# Patient Record
Sex: MALE | Race: WHITE | NOT HISPANIC OR LATINO | Employment: OTHER | URBAN - METROPOLITAN AREA
[De-identification: names, ages, dates, MRNs, and addresses within clinical notes are randomized per-mention and may not be internally consistent; named-entity substitution may affect disease eponyms.]

---

## 2017-03-22 ENCOUNTER — DEVICE CHECK (OUTPATIENT)
Dept: CARDIOLOGY | Facility: MEDICAL CENTER | Age: 67
End: 2017-03-22

## 2017-03-22 NOTE — PROGRESS NOTES
Type of monitoring: Remote    : SourceTour    Date of Transmission: 3-    Type of device: CRT-D    Mode: VVIR-persistent AF/    Rate: 60 ppm    Right Ventricular: 460 ohms  Left Ventricular: 658 ohms  High Voltage: 67 ohms      Battery status: 7 yrs  Charge time: 9.2 seconds    Episodes: since :1-VT-1; 392-NSVT/NST    Keep appt in May with RS/pmc

## 2017-05-16 DIAGNOSIS — I10 ESSENTIAL HYPERTENSION: ICD-10-CM

## 2017-05-16 DIAGNOSIS — I50.22 CHRONIC SYSTOLIC CONGESTIVE HEART FAILURE (HCC): ICD-10-CM

## 2017-05-16 DIAGNOSIS — I42.0 DILATED CARDIOMYOPATHY (HCC): ICD-10-CM

## 2017-05-16 RX ORDER — LOSARTAN POTASSIUM 50 MG/1
50 TABLET ORAL DAILY
Qty: 90 TAB | Refills: 3 | OUTPATIENT
Start: 2017-05-16

## 2017-05-17 ENCOUNTER — TELEPHONE (OUTPATIENT)
Dept: CARDIOLOGY | Facility: MEDICAL CENTER | Age: 67
End: 2017-05-17

## 2017-05-17 DIAGNOSIS — I42.0 DILATED CARDIOMYOPATHY (HCC): ICD-10-CM

## 2017-05-17 RX ORDER — CARVEDILOL 25 MG/1
25 TABLET ORAL 2 TIMES DAILY WITH MEALS
Qty: 180 TAB | Refills: 3 | Status: SHIPPED | OUTPATIENT
Start: 2017-05-17 | End: 2017-06-19 | Stop reason: SDUPTHER

## 2017-05-17 NOTE — TELEPHONE ENCOUNTER
Jasmin Garcia R.N.                   EDWARD/Sriram Mar is calling back with update. States he's doing well and pulse is at 79. He can be reached at [phone] for call back.

## 2017-05-17 NOTE — TELEPHONE ENCOUNTER
----- Message from Samreen Peñaloza, Med Ass't sent at 5/17/2017  9:05 AM PDT -----  Patient's device transmitted via home monitor-- patient received shock appears to be AF with RVR-- current EGM does show a HR of 200 bpm.  He states that he has been out of his Carvedilol since the weekend-- awaiting refill.  BIV device/no RA lead. Detection is at 195.    I advised he may get another shock due to HR.    Thanks  Samreen

## 2017-05-17 NOTE — TELEPHONE ENCOUNTER
Notified AUDREY, spoke with pt. Great news!! He will keep appt with RS next week, but cant make FU DS bc his house sold 2 days on the market and he will move to [facility] next month, he has cardiologist up there, advised to call now for appt when he gets there.

## 2017-05-17 NOTE — TELEPHONE ENCOUNTER
Carvedilol called in. Advised pt get dose and take it asap. Come in this am to see JE as hasnt seen EP in a few years. Pt will attempt around 11, if cant make it will come in at 1.

## 2017-05-17 NOTE — TELEPHONE ENCOUNTER
Per JE since last dose carvedilol was Sunday as he ran out, pt to get dose in him asap, det rate is 195, if rate comes down pt does not need to come in and will schedule FU with DS, pt will call this afternoon around 1 to check in.

## 2017-05-24 ENCOUNTER — NON-PROVIDER VISIT (OUTPATIENT)
Dept: CARDIOLOGY | Facility: MEDICAL CENTER | Age: 67
End: 2017-05-24
Payer: MEDICARE

## 2017-05-24 ENCOUNTER — OFFICE VISIT (OUTPATIENT)
Dept: CARDIOLOGY | Facility: MEDICAL CENTER | Age: 67
End: 2017-05-24
Payer: MEDICARE

## 2017-05-24 VITALS
WEIGHT: 196 LBS | SYSTOLIC BLOOD PRESSURE: 110 MMHG | BODY MASS INDEX: 26.55 KG/M2 | DIASTOLIC BLOOD PRESSURE: 60 MMHG | HEIGHT: 72 IN | OXYGEN SATURATION: 96 % | HEART RATE: 64 BPM

## 2017-05-24 DIAGNOSIS — Z79.01 ANTICOAGULATED ON WARFARIN: ICD-10-CM

## 2017-05-24 DIAGNOSIS — I48.21 PERMANENT ATRIAL FIBRILLATION (HCC): ICD-10-CM

## 2017-05-24 DIAGNOSIS — Z95.810 BIVENTRICULAR IMPLANTABLE CARDIOVERTER-DEFIBRILLATOR IN SITU: ICD-10-CM

## 2017-05-24 DIAGNOSIS — I48.19 PERSISTENT ATRIAL FIBRILLATION (HCC): ICD-10-CM

## 2017-05-24 DIAGNOSIS — I50.22 CHRONIC SYSTOLIC CONGESTIVE HEART FAILURE (HCC): ICD-10-CM

## 2017-05-24 DIAGNOSIS — I42.0 DILATED CARDIOMYOPATHY (HCC): ICD-10-CM

## 2017-05-24 PROCEDURE — 4004F PT TOBACCO SCREEN RCVD TLK: CPT | Performed by: INTERNAL MEDICINE

## 2017-05-24 PROCEDURE — 99214 OFFICE O/P EST MOD 30 MIN: CPT | Performed by: INTERNAL MEDICINE

## 2017-05-24 PROCEDURE — G8432 DEP SCR NOT DOC, RNG: HCPCS | Performed by: INTERNAL MEDICINE

## 2017-05-24 PROCEDURE — 93283 PRGRMG EVAL IMPLANTABLE DFB: CPT | Performed by: INTERNAL MEDICINE

## 2017-05-24 PROCEDURE — 3017F COLORECTAL CA SCREEN DOC REV: CPT | Mod: 8P | Performed by: INTERNAL MEDICINE

## 2017-05-24 PROCEDURE — G8417 CALC BMI ABV UP PARAM F/U: HCPCS | Performed by: INTERNAL MEDICINE

## 2017-05-24 PROCEDURE — 1101F PT FALLS ASSESS-DOCD LE1/YR: CPT | Mod: 8P | Performed by: INTERNAL MEDICINE

## 2017-05-24 PROCEDURE — 4040F PNEUMOC VAC/ADMIN/RCVD: CPT | Performed by: INTERNAL MEDICINE

## 2017-05-24 ASSESSMENT — ENCOUNTER SYMPTOMS
PALPITATIONS: 0
NEUROLOGICAL NEGATIVE: 1
WEIGHT LOSS: 0
NAUSEA: 0
WEAKNESS: 0
BRUISES/BLEEDS EASILY: 0
PSYCHIATRIC NEGATIVE: 1
VOMITING: 0
SHORTNESS OF BREATH: 0

## 2017-05-24 NOTE — PROGRESS NOTES
"Subjective:   Jeffry Naik is a 67 y.o. male who presents today for follow-up of permanent atrial fibrillation, chronic anticoagulation, history of nonischemic cardiopathy, and biventricular AICD. The patient has had no bleeding problems on Coumadin. He ran out of his carvedilol for about 3 days and received a shock for rapid atrial fibrillation. His AICD was interrogated today and is functioning normally. He's had no more discharges since being back on his carvedilol.  The patient's had no edema or exertional dyspnea.  His wife  in April after a long solorzano with cancer, and the patient is planning to move to the [facility] to be near family.    Past Medical History   Diagnosis Date   • Atrial fibrillation (CMS-HCC)    • Congestive heart failure (CMS-[facility])    • Dilated cardiomyopathy (CMS-HCC)    • Diabetes      oral meds and insulin   • Prostate cancer (CMS-[facility])    • Biventricular implantable cardiac defibrillator in situ     • Chronic anticoagulation    • Hyperlipidemia    • Anesthesia      \"when I had my pacemaker placed,  my eyes where closed but I could feel everything\"   • Unspecified urinary incontinence    • Unspecified hemorrhagic conditions (CMS-HCC)      on coumadin   • Hypertension    • Bronchitis    • Pain 02-18-15     neck and back, 3/10   • Peripheral neuropathy (CMS-[facility])      bilat arms and hand     Past Surgical History   Procedure Laterality Date   • Aicd implant  2011     [employer] Cognis 100-D N118 implanted by Dr. Lake.  Original device implanted in .   • Cholecystectomy     • Prostatectomy, radical retro     • Recovery  2015     Performed by -Recovery Surgery at SURGERY SAME DAY [facility] ORS     Family History   Problem Relation Age of Onset   • Heart Attack Mother      History   Smoking status   • Light Tobacco Smoker -- 0.25 packs/day   • Types: Cigarettes   Smokeless tobacco   • Never Used     Comment: \"1-3 cigars per " "week\"     Allergies   Allergen Reactions   • Oxycodone-Acetaminophen Vomiting and Nausea   • Iodine Hives and Diarrhea         • Plasma Hives     Okay if given Benadryl.   • Levofloxacin [Levaquin]      Unknown rxn; \"Possibly sick to my stomach.\"   • Statins [Hmg-Coa-R Inhibitors]      Weak muscles.     Outpatient Encounter Prescriptions as of 5/24/2017   Medication Sig Dispense Refill   • carvedilol (COREG) 25 MG Tab Take 1 Tab by mouth 2 times a day, with meals. 180 Tab 3   • losartan (COZAAR) 50 MG Tab Take 1 Tab by mouth every day. 90 Tab 3   • B Complex Vitamins (VITAMIN B COMPLEX PO) Take  by mouth 2 Times a Day.     • warfarin (COUMADIN) 2 MG Tab Take 1 Tab by mouth every day. 30 Tab 0   • pramoxine (PROCTOFOAM) 1 % foam Insert  in rectum 4 times a day as needed.     • Calcium Carbonate (CALCIUM 600 PO) Take 1 Tab by mouth 2 Times a Day.     • Cholecalciferol (VITAMIN D3) 5000 UNITS CAPS Take 1 Cap by mouth every day.     • Ascorbic Acid (VITAMIN C) 1000 MG TABS Take 1,000 mg by mouth every evening. Indications: **OTC**     • Coenzyme Q10 (CO Q 10) 100 MG CAPS Take 100 mg by mouth 2 Times a Day. Indications: **OTC**     • insulin glargine (LANTUS) 100 UNIT/ML SOLN Inject 30 Units as instructed every evening. Sliding scale:  >130=35 units  >135=40 units  >140=45 units  >145=50 units     • leuprolide (LUPRON) 22.5 MG KIT 22.5 mg by Intramuscular route Every 3 Months.     • venlafaxine XR (EFFEXOR XR) 37.5 MG CP24 Take 37.5 mg by mouth every morning.     • metformin (GLUCOPHAGE) 1000 MG tablet Take 1,000 mg by mouth 2 times a day.     • bicalutamide (CASODEX) 50 MG TABS Take 50 mg by mouth every morning.     • gemfibrozil (LOPID) 600 MG TABS Take 300 mg by mouth 2 times a day.     • omeprazole (PRILOSEC) 20 MG CPDR Take 20 mg by mouth 2 Times a Day.       No facility-administered encounter medications on file as of 5/24/2017.     Review of Systems   Constitutional: Negative for weight loss and malaise/fatigue. " "  HENT: Negative for nosebleeds.    Respiratory: Negative for shortness of breath.    Cardiovascular: Negative for chest pain, palpitations and leg swelling.   Gastrointestinal: Negative for nausea and vomiting.   Neurological: Negative.  Negative for weakness.   Endo/Heme/Allergies: Does not bruise/bleed easily.   Psychiatric/Behavioral: Negative.         Objective:   /60 mmHg  Pulse 64  Ht 1.829 m (6' 0.01\")  Wt 88.905 kg (196 lb)  BMI 26.58 kg/m2  SpO2 96%    Physical Exam   Constitutional: He is oriented to person, place, and time. He appears well-developed and well-nourished. No distress.   HENT:   Head: Atraumatic.   Eyes: Conjunctivae and EOM are normal. Pupils are equal, round, and reactive to light. No scleral icterus.   Neck: Neck supple. No JVD present. No thyromegaly present.   Cardiovascular: Normal rate, regular rhythm and intact distal pulses.    No murmur heard.  Pulmonary/Chest: Effort normal and breath sounds normal. No respiratory distress. He has no wheezes. He has no rales.   AICD left upper chest.   Abdominal: Soft. Bowel sounds are normal. He exhibits no distension and no mass. There is no hepatomegaly. There is no tenderness. No hernia.   Musculoskeletal: He exhibits no edema.   Neurological: He is alert and oriented to person, place, and time.   Skin: Skin is warm and dry. He is not diaphoretic.   Psychiatric: He has a normal mood and affect.       Assessment:     1. Anticoagulated on warfarin     2. Biventricular implantable cardioverter-defibrillator in situ     3. Chronic systolic congestive heart failure (CMS-HCC)     4. Dilated cardiomyopathy (CMS-HCC)     5. Permanent atrial fibrillation (CMS-HCC)         Medical Decision Making:  Today's Assessment / Status / Plan:     His resting atrial fibrillation rate is well controlled on his current dose of carvedilol. There is no evidence of Lyme overload on exam. By most recent echo in March, 2016, his EF had recovered to 50%. " Continue current medications. He will establish care with his previous cardiologist back in [place]

## 2017-05-24 NOTE — MR AVS SNAPSHOT
"        Jeffry Naik   2017 10:40 AM   Office Visit   MRN: [id number]    Department:  [facility]   Dept Phone:  [phone]    Description:  Male : 1950   Provider:  Wolf Grady M.D.           Reason for Visit     Follow-Up           Allergies as of 2017     Allergen Noted Reactions    Oxycodone-Acetaminophen 2014   Vomiting, Nausea    Iodine 2014   Hives, Diarrhea         Plasma 2014   Hives    Okay if given Benadryl.    Levofloxacin [Levaquin] 2014       Unknown rxn; \"Possibly sick to my stomach.\"    Statins [Hmg-Coa-R Inhibitors] 2014       Weak muscles.      You were diagnosed with     Anticoagulated on warfarin   [591320]       Biventricular implantable cardioverter-defibrillator in situ   [0645770]       Chronic systolic congestive heart failure (CMS-HCC)   [346115]       Dilated cardiomyopathy (CMS-HCC)   [310809]       Permanent atrial fibrillation (CMS-HCC)   [010328]         Vital Signs     Blood Pressure Pulse Height Weight Body Mass Index Oxygen Saturation    110/60 mmHg 64 1.829 m (6' 0.01\") 88.905 kg (196 lb) 26.58 kg/m2 96%    Smoking Status                   Light Tobacco Smoker           Basic Information     Date Of Birth Sex Race Ethnicity Preferred Language    1950 Male White Non- English      Your appointments     May 24, 2017 10:40 AM   FOLLOW UP with Wolf Grady M.D.   [facility] (--)    [address], [address]  [place] [address] [phone]            May 26, 2017  9:30 AM   US ABDOMEN FASTING (30 MINUTES) with [facility] US 2   [facility]    [facility]  [address]  [address]  [place] [id number] [phone]           NPO 8 hours.  For  Abdomen, NPO 2 hours, schedule Abdomen Complete and include \" Abdomen\" in Appt Notes.              Problem List              ICD-10-CM Priority Class Noted - " Resolved    Atrial fibrillation (CMS-HCC) I48.91 Medium  Unknown - Present    Congestive heart failure (CMS-HCC) I50.9 High  Unknown - Present    Dilated cardiomyopathy (CMS-HCC) I42.0 High  Unknown - Present    Prostate cancer (CMS-HCC) C61 Low  Unknown - Present    Anticoagulated on warfarin Z79.01 High  12/19/2011 - Present    Diabetes type 2, controlled (CMS-HCC) E11.9 Medium  12/19/2011 - Present    Hypertriglyceridemia E78.1 Medium  12/19/2011 - Present    Epistaxis R04.0 High  7/6/2012 - Present    Biventricular implantable cardioverter-defibrillator in situ Z95.810 High  7/27/2012 - Present    NSVT (nonsustained ventricular tachycardia) (CMS-HCC) I47.2 Medium  12/5/2012 - Present    Chest pain R07.9   12/31/2014 - Present    Brachial neuritis or radiculitis M54.12   2/19/2015 - Present    Nontraumatic intracerebral hemorrhage (CMS-HCC) I61.9 High  3/23/2016 - Present    Alcohol intoxication F10.129 Medium  3/23/2016 - Present    Fall W19.XXXA Medium  3/23/2016 - Present    Hypertension I10 Medium  3/23/2016 - Present      Health Maintenance        Date Due Completion Dates    DIABETES MONOFILAMENT / LE EXAM 1950 ---    RETINAL SCREENING 4/29/1966 ---    URINE ACR / MICROALBUMIN 4/29/1966 ---    COLONOSCOPY 4/29/1998 ---    IMM ZOSTER VACCINE 4/29/2008 ---    A1C SCREENING 6/30/2015 12/31/2014    FASTING LIPID PROFILE 1/1/2016 1/1/2015    IMM PNEUMOCOCCAL 65+ (ADULT) LOW/MEDIUM RISK SERIES (2 of 2 - PPSV23) 10/23/2016 10/23/2015, 10/7/2011    SERUM CREATININE 5/29/2017 5/29/2016, 4/3/2016, 3/22/2016, 1/1/2015, 12/31/2014, 7/8/2012, 7/7/2012    IMM DTaP/Tdap/Td Vaccine (2 - Td) 10/29/2022 10/29/2012            Current Immunizations     13-VALENT PCV PREVNAR 10/23/2015    Influenza TIV (IM) 10/23/2015, 10/7/2011    Pneumococcal polysaccharide vaccine (PPSV-23) 10/7/2011    Tdap Vaccine 10/29/2012 12:00 PM      Below and/or attached are the medications your provider expects you to take. Review all of your  home medications and newly ordered medications with your provider and/or pharmacist. Follow medication instructions as directed by your provider and/or pharmacist. Please keep your medication list with you and share with your provider. Update the information when medications are discontinued, doses are changed, or new medications (including over-the-counter products) are added; and carry medication information at all times in the event of emergency situations     Allergies:  OXYCODONE-ACETAMINOPHEN - Vomiting,Nausea     IODINE - Hives,Diarrhea     PLASMA - Hives     LEVOFLOXACIN - (reactions not documented)     STATINS - (reactions not documented)               Medications  Valid as of: May 24, 2017 - 10:38 AM    Generic Name Brand Name Tablet Size Instructions for use    Ascorbic Acid (Tab) Vitamin C 1000 MG Take 1,000 mg by mouth every evening. Indications: **OTC**        B Complex Vitamins   Take  by mouth 2 Times a Day.        Bicalutamide (Tab) CASODEX 50 MG Take 50 mg by mouth every morning.        Calcium Carbonate   Take 1 Tab by mouth 2 Times a Day.        Carvedilol (Tab) COREG 25 MG Take 1 Tab by mouth 2 times a day, with meals.        Cholecalciferol (Cap) vitamin D3 5000 UNITS Take 1 Cap by mouth every day.        Coenzyme Q10 (Cap) Co Q 10 100 MG Take 100 mg by mouth 2 Times a Day. Indications: **OTC**        Gemfibrozil (Tab) LOPID 600 MG Take 300 mg by mouth 2 times a day.        Insulin Glargine (Solution) LANTUS 100 UNIT/ML Inject 30 Units as instructed every evening. Sliding scale:  >130=35 units  >135=40 units  >140=45 units  >145=50 units        Leuprolide Acetate (3 Month) (Kit) LUPRON 22.5 MG 22.5 mg by Intramuscular route Every 3 Months.        Losartan Potassium (Tab) COZAAR 50 MG Take 1 Tab by mouth every day.        MetFORMIN HCl (Tab) GLUCOPHAGE 1000 MG Take 1,000 mg by mouth 2 times a day.        Omeprazole (CAPSULE DELAYED RELEASE) PRILOSEC 20 MG Take 20 mg by mouth 2 Times a Day.           Pramoxine HCl (Foam) PROCTOFOAM 1 % Insert  in rectum 4 times a day as needed.        Venlafaxine HCl (CAPSULE SR 24 HR) EFFEXOR XR 37.5 MG Take 37.5 mg by mouth every morning.        Warfarin Sodium (Tab) COUMADIN 2 MG Take 1 Tab by mouth every day.        .                 Medicines prescribed today were sent to:     [id number] - [place] [address]    Phone: [phone] Fax: [phone]    Open 24 Hours?: No      Medication refill instructions:       If your prescription bottle indicates you have medication refills left, it is not necessary to call your provider’s office. Please contact your pharmacy and they will refill your medication.    If your prescription bottle indicates you do not have any refills left, you may request refills at any time through one of the following ways: The online [employer] system (except Urgent Care), by calling your provider’s office, or by asking your pharmacy to contact your provider’s office with a refill request. Medication refills are processed only during regular business hours and may not be available until the next business day. Your provider may request additional information or to have a follow-up visit with you prior to refilling your medication.   *Please Note: Medication refills are assigned a new Rx number when refilled electronically. Your pharmacy may indicate that no refills were authorized even though a new prescription for the same medication is available at the pharmacy. Please request the medicine by name with the pharmacy before contacting your provider for a refill.           [employer] Access Code: [email or web address]  Expires: 6/23/2017 10:38 AM    [employer]  A secure, online tool to manage your health information     [employer]’s [employer]® is a secure, online tool that connects you to your personalized health information from the privacy of your home -- day or night - making it very easy for you to manage your  healthcare. Once the activation process is completed, you can even access your medical information using the PandaBed chanell, which is available for free in the Apple Chanell store or Google Play store.     PandaBed provides the following levels of access (as shown below):   My Chart Features   [facility] Doctor [facility]  Specialists [facility]  Urgent  Care Non-[facility]  Primary Care  Doctor   Email your healthcare team securely and privately 24/7 X X X    Manage appointments: schedule your next appointment; view details of past/upcoming appointments X      Request prescription refills. X      View recent personal medical records, including lab and immunizations X X X X   View health record, including health history, allergies, medications X X X X   Read reports about your outpatient visits, procedures, consult and ER notes X X X X   See your discharge summary, which is a recap of your hospital and/or ER visit that includes your diagnosis, lab results, and care plan. X X       How to register for PandaBed:  1. Go to  [email or web address].  2. Click on the Sign Up Now box, which takes you to the New Member Sign Up page. You will need to provide the following information:  a. Enter your PandaBed Access Code exactly as it appears at the top of this page. (You will not need to use this code after you’ve completed the sign-up process. If you do not sign up before the expiration date, you must request a new code.)   b. Enter your date of birth.   c. Enter your home email address.   d. Click Submit, and follow the next screen’s instructions.  3. Create a PandaBed ID. This will be your PandaBed login ID and cannot be changed, so think of one that is secure and easy to remember.  4. Create a PandaBed password. You can change your password at any time.  5. Enter your Password Reset Question and Answer. This can be used at a later time if you forget your password.   6. Enter your e-mail address. This allows you to receive e-mail  notifications when new information is available in [employer].  7. Click Sign Up. You can now view your health information.    For assistance activating your [employer] account, call [phone]        Quit Tobacco Information     Do you want to quit using tobacco?    Quitting tobacco decreases risks of cancer, heart and lung disease, increases life expectancy, improves sense of taste and smell, and increases spending money, among other benefits.    If you are thinking about quitting, we can help.  • [facility] Quit Tobacco Program: 101.215.8940  o Program occurs weekly for four weeks and includes pharmacist consultation on products to support quitting smoking or chewing tobacco. A provider referral is needed for pharmacist consultation.  • Tobacco Users Help Hotline: 4-800QUITNOW (360-2722) or https://[place].quitlogix.org/  o Free, confidential telephone and online coaching for [place] residents. Sessions are designed on a schedule that is convenient for you. Eligible clients receive free nicotine replacement therapy.  • Nationally: www.smokefree.gov  o Information and professional assistance to support both immediate and long-term needs as you become, and remain, a non-smoker. Smokefree.gov allows you to choose the help that best fits your needs.

## 2017-05-26 ENCOUNTER — HOSPITAL ENCOUNTER (OUTPATIENT)
Dept: RADIOLOGY | Facility: MEDICAL CENTER | Age: 67
End: 2017-05-26
Attending: INTERNAL MEDICINE
Payer: MEDICARE

## 2017-05-26 DIAGNOSIS — C61 MALIGNANT NEOPLASM OF PROSTATE (HCC): ICD-10-CM

## 2017-05-26 PROCEDURE — 76700 US EXAM ABDOM COMPLETE: CPT

## 2017-06-16 ENCOUNTER — TELEPHONE (OUTPATIENT)
Dept: CARDIOLOGY | Facility: MEDICAL CENTER | Age: 67
End: 2017-06-16

## 2017-06-16 NOTE — TELEPHONE ENCOUNTER
"Called pt. He was packing and moving boxes and became short of breath/. He decided to sit down and take a break, and then he got a shock (for A Fib, per Samreen). \"I just overdid it.\"  He is moving to [place] on 6-30-17. He said he feels \"okay\" today, but his chest is sore. (His last shock was in May.)  Scheduled pt. To see Dr. Lake on Mon. 6-19-17.   "

## 2017-06-16 NOTE — TELEPHONE ENCOUNTER
----- Message from Ligia Rico sent at 6/16/2017 10:13 AM PDT -----  Regarding: pt rec'd a shock; pls call to triage  Remote monitor: pt rec'd a shock yesterday around noon. pls call to triage.  i will enter remote data and scan into EPIC.  ny Lindsey

## 2017-06-19 ENCOUNTER — OFFICE VISIT (OUTPATIENT)
Dept: CARDIOLOGY | Facility: MEDICAL CENTER | Age: 67
End: 2017-06-19
Payer: MEDICARE

## 2017-06-19 VITALS
SYSTOLIC BLOOD PRESSURE: 140 MMHG | BODY MASS INDEX: 27.09 KG/M2 | DIASTOLIC BLOOD PRESSURE: 84 MMHG | HEART RATE: 108 BPM | HEIGHT: 72 IN | WEIGHT: 200 LBS | OXYGEN SATURATION: 95 %

## 2017-06-19 DIAGNOSIS — I50.20 SYSTOLIC CONGESTIVE HEART FAILURE, UNSPECIFIED CONGESTIVE HEART FAILURE CHRONICITY: ICD-10-CM

## 2017-06-19 DIAGNOSIS — Z79.01 ANTICOAGULATED ON WARFARIN: ICD-10-CM

## 2017-06-19 DIAGNOSIS — I48.19 PERSISTENT ATRIAL FIBRILLATION (HCC): ICD-10-CM

## 2017-06-19 DIAGNOSIS — Z95.810 BIVENTRICULAR IMPLANTABLE CARDIOVERTER-DEFIBRILLATOR IN SITU: ICD-10-CM

## 2017-06-19 DIAGNOSIS — I42.0 DILATED CARDIOMYOPATHY (HCC): ICD-10-CM

## 2017-06-19 DIAGNOSIS — I47.29 NSVT (NONSUSTAINED VENTRICULAR TACHYCARDIA) (HCC): ICD-10-CM

## 2017-06-19 PROCEDURE — 99214 OFFICE O/P EST MOD 30 MIN: CPT | Mod: 25 | Performed by: INTERNAL MEDICINE

## 2017-06-19 PROCEDURE — 93283 PRGRMG EVAL IMPLANTABLE DFB: CPT | Performed by: INTERNAL MEDICINE

## 2017-06-19 RX ORDER — CARVEDILOL 25 MG/1
25 TABLET ORAL 2 TIMES DAILY WITH MEALS
Qty: 180 TAB | Refills: 3 | Status: SHIPPED | OUTPATIENT
Start: 2017-06-19 | End: 2018-01-15 | Stop reason: SDUPTHER

## 2017-06-19 RX ORDER — DIGOXIN 250 MCG
250 TABLET ORAL DAILY
Qty: 90 TAB | Refills: 3 | Status: SHIPPED | OUTPATIENT
Start: 2017-06-19 | End: 2018-06-15 | Stop reason: SDUPTHER

## 2017-06-19 NOTE — MR AVS SNAPSHOT
"        Jeffry Naik   2017 4:00 PM   Office Visit   MRN: [id number]    Department:  [facility]   Dept Phone:  [phone]    Description:  Male : 1950   Provider:  Pete Lake M.D.           Reason for Visit     Follow-Up           Allergies as of 2017     Allergen Noted Reactions    Oxycodone-Acetaminophen 2014   Vomiting, Nausea    Iodine 2014   Hives, Diarrhea         Plasma 2014   Hives    Okay if given Benadryl.    Levofloxacin [Levaquin] 2014       Unknown rxn; \"Possibly sick to my stomach.\"    Statins [Hmg-Coa-R Inhibitors] 2014       Weak muscles.      You were diagnosed with     Persistent atrial fibrillation (CMS-[facility])   [112110]       Dilated cardiomyopathy (CMS-[facility])   [239928]         Vital Signs     Blood Pressure Pulse Height Weight Body Mass Index Oxygen Saturation    140/84 mmHg 108 1.829 m (6' 0.01\") 90.719 kg (200 lb) 27.12 kg/m2 95%    Smoking Status                   Light Tobacco Smoker           Basic Information     Date Of Birth Sex Race Ethnicity Preferred Language    1950 Male White Non- English      Problem List              ICD-10-CM Priority Class Noted - Resolved    Atrial fibrillation (CMS-[facility]) I48.91 Medium  Unknown - Present    Congestive heart failure (CMS-[facility]) I50.9 High  Unknown - Present    Dilated cardiomyopathy (CMS-[facility]) I42.0 High  Unknown - Present    Prostate cancer (CMS-[facility]) C61 Low  Unknown - Present    Anticoagulated on warfarin Z79.01 High  2011 - Present    Diabetes type 2, controlled (CMS-[facility]) E11.9 Medium  2011 - Present    Hypertriglyceridemia E78.1 Medium  2011 - Present    Epistaxis R04.0 High  2012 - Present    Biventricular implantable cardioverter-defibrillator in situ Z95.810 High  2012 - Present    NSVT (nonsustained ventricular tachycardia) (CMS-[facility]) I47.2 Medium  2012 - Present    Chest pain R07.9   2014 - Present    Brachial neuritis or " radiculitis M54.12   2/19/2015 - Present    Nontraumatic intracerebral hemorrhage (CMS-[facility]) I61.9 High  3/23/2016 - Present    Alcohol intoxication F10.129 Medium  3/23/2016 - Present    Fall W19.XXXA Medium  3/23/2016 - Present    Hypertension I10 Medium  3/23/2016 - Present      Health Maintenance        Date Due Completion Dates    DIABETES MONOFILAMENT / LE EXAM 1950 ---    RETINAL SCREENING 4/29/1966 ---    URINE ACR / MICROALBUMIN 4/29/1966 ---    COLONOSCOPY 4/29/1998 ---    IMM ZOSTER VACCINE 4/29/2008 ---    A1C SCREENING 6/30/2015 12/31/2014    FASTING LIPID PROFILE 1/1/2016 1/1/2015    IMM PNEUMOCOCCAL 65+ (ADULT) LOW/MEDIUM RISK SERIES (2 of 2 - PPSV23) 10/23/2016 10/23/2015, 10/7/2011    SERUM CREATININE 5/29/2017 5/29/2016, 4/3/2016, 3/22/2016, 1/1/2015, 12/31/2014, 7/8/2012, 7/7/2012    IMM DTaP/Tdap/Td Vaccine (2 - Td) 10/29/2022 10/29/2012            Current Immunizations     13-VALENT PCV PREVNAR 10/23/2015    Influenza TIV (IM) 10/23/2015, 10/7/2011    Pneumococcal polysaccharide vaccine (PPSV-23) 10/7/2011    Tdap Vaccine 10/29/2012 12:00 PM      Below and/or attached are the medications your provider expects you to take. Review all of your home medications and newly ordered medications with your provider and/or pharmacist. Follow medication instructions as directed by your provider and/or pharmacist. Please keep your medication list with you and share with your provider. Update the information when medications are discontinued, doses are changed, or new medications (including over-the-counter products) are added; and carry medication information at all times in the event of emergency situations     Allergies:  OXYCODONE-ACETAMINOPHEN - Vomiting,Nausea     IODINE - Hives,Diarrhea     PLASMA - Hives     LEVOFLOXACIN - (reactions not documented)     STATINS - (reactions not documented)               Medications  Valid as of: June 19, 2017 -  4:01 PM    Generic Name Brand Name Tablet Size  Instructions for use    Ascorbic Acid (Tab) Vitamin C 1000 MG Take 1,000 mg by mouth every evening. Indications: **OTC**        B Complex Vitamins   Take  by mouth 2 Times a Day.        Bicalutamide (Tab) CASODEX 50 MG Take 50 mg by mouth every morning.        Calcium Carbonate   Take 1 Tab by mouth 2 Times a Day.        Carvedilol (Tab) COREG 25 MG Take 1 Tab by mouth 2 times a day, with meals.        Cholecalciferol (Cap) vitamin D3 5000 UNITS Take 1 Cap by mouth every day.        Coenzyme Q10 (Cap) Co Q 10 100 MG Take 100 mg by mouth 2 Times a Day. Indications: **OTC**        Digoxin (Tab) LANOXIN 250 MCG Take 1 Tab by mouth every day.        Gemfibrozil (Tab) LOPID 600 MG Take 300 mg by mouth 2 times a day.        Insulin Glargine (Solution) LANTUS 100 UNIT/ML Inject 30 Units as instructed every evening. Sliding scale:  >130=35 units  >135=40 units  >140=45 units  >145=50 units        Leuprolide Acetate (3 Month) (Kit) LUPRON 22.5 MG 22.5 mg by Intramuscular route Every 3 Months.        Losartan Potassium (Tab) COZAAR 50 MG Take 1 Tab by mouth every day.        MetFORMIN HCl (Tab) GLUCOPHAGE 1000 MG Take 1,000 mg by mouth 2 times a day.        Omeprazole (CAPSULE DELAYED RELEASE) PRILOSEC 20 MG Take 20 mg by mouth 2 Times a Day.        Pramoxine HCl (Foam) PROCTOFOAM 1 % Insert  in rectum 4 times a day as needed.        Venlafaxine HCl (CAPSULE SR 24 HR) EFFEXOR XR 37.5 MG Take 75 mg by mouth every day.        Warfarin Sodium (Tab) COUMADIN 2 MG Take 1 Tab by mouth every day.        .                 Medicines prescribed today were sent to:     [facility][id number] - [place] - [address]    [address] [place] [address]    Phone: [phone] Fax: [phone]    Open 24 Hours?: No      Medication refill instructions:       If your prescription bottle indicates you have medication refills left, it is not necessary to call your provider’s office. Please contact your pharmacy and they will refill your  medication.    If your prescription bottle indicates you do not have any refills left, you may request refills at any time through one of the following ways: The online [employer] system (except Urgent Care), by calling your provider’s office, or by asking your pharmacy to contact your provider’s office with a refill request. Medication refills are processed only during regular business hours and may not be available until the next business day. Your provider may request additional information or to have a follow-up visit with you prior to refilling your medication.   *Please Note: Medication refills are assigned a new Rx number when refilled electronically. Your pharmacy may indicate that no refills were authorized even though a new prescription for the same medication is available at the pharmacy. Please request the medicine by name with the pharmacy before contacting your provider for a refill.           MyChart Status: Patient Declined        Quit Tobacco Information     Do you want to quit using tobacco?    Quitting tobacco decreases risks of cancer, heart and lung disease, increases life expectancy, improves sense of taste and smell, and increases spending money, among other benefits.    If you are thinking about quitting, we can help.  • [employer] Quit Tobacco Program: [phone]  o Program occurs weekly for four weeks and includes pharmacist consultation on products to support quitting smoking or chewing tobacco. A provider referral is needed for pharmacist consultation.  • Tobacco Users Help Hotline: 0-962-QUIT-NOW (491-3254) or https://[place].quitlogix.org/  o Free, confidential telephone and online coaching for [place] residents. Sessions are designed on a schedule that is convenient for you. Eligible clients receive free nicotine replacement therapy.  • Nationally: www.smokefree.gov  o Information and professional assistance to support both immediate and long-term needs as you become, and remain, a  non-smoker. Smokefree.gov allows you to choose the help that best fits your needs.

## 2017-06-19 NOTE — Clinical Note
"     [facility]   [address]  [place] [address]  Phone: [phone]  Fax: [phone]              Jeffry Naik  1950    Encounter Date: 6/19/2017    Pete Lake M.D.          PROGRESS NOTE:  Subjective:   Jeffry Naik is a 67 y.o. male who presents today who has CAF and BIV AICD. Has been somewhat non compliant with meds. Just lost his wife and is moving to [place]. Has received a couple shocks for a fib with RVR.    Past Medical History   Diagnosis Date   • Atrial fibrillation (CMS-HCC)    • Congestive heart failure (CMS-HCC)    • Dilated cardiomyopathy (CMS-[facility])    • Diabetes      oral meds and insulin   • Prostate cancer (CMS-[facility]) 2009   • Biventricular implantable cardiac defibrillator in situ     • Chronic anticoagulation    • Hyperlipidemia    • Anesthesia      \"when I had my pacemaker placed,  my eyes where closed but I could feel everything\"   • Unspecified urinary incontinence    • Unspecified hemorrhagic conditions      on coumadin   • Hypertension    • Bronchitis 2012   • Pain 02-18-15     neck and back, 3/10   • Peripheral neuropathy      bilat arms and hand     Past Surgical History   Procedure Laterality Date   • Aicd implant  December 2011     [employer] Cognis 100-D N118 implanted by Dr. Lake.  Original device implanted in 2006.   • Cholecystectomy  2005   • Prostatectomy, radical retro  2009   • Recovery  2/19/2015     Performed by [facility] at SURGERY SAME DAY [facility] ORS     Family History   Problem Relation Age of Onset   • Heart Attack Mother      History   Smoking status   • Light Tobacco Smoker -- 0.25 packs/day   • Types: Cigarettes   Smokeless tobacco   • Never Used     Comment: \"1-3 cigars per week\"     Allergies   Allergen Reactions   • Oxycodone-Acetaminophen Vomiting and Nausea   • Iodine Hives and Diarrhea         • Plasma Hives     Okay if given Benadryl.   • Levofloxacin [Levaquin]  " "    Unknown rxn; \"Possibly sick to my stomach.\"   • Statins [Hmg-Coa-R Inhibitors]      Weak muscles.     Outpatient Encounter Prescriptions as of 6/19/2017   Medication Sig Dispense Refill   • digoxin (LANOXIN) 250 MCG Tab Take 1 Tab by mouth every day. 90 Tab 3   • carvedilol (COREG) 25 MG Tab Take 1 Tab by mouth 2 times a day, with meals. 180 Tab 3   • losartan (COZAAR) 50 MG Tab Take 1 Tab by mouth every day. 90 Tab 3   • B Complex Vitamins (VITAMIN B COMPLEX PO) Take  by mouth 2 Times a Day.     • warfarin (COUMADIN) 2 MG Tab Take 1 Tab by mouth every day. 30 Tab 0   • pramoxine (PROCTOFOAM) 1 % foam Insert  in rectum 4 times a day as needed.     • Calcium Carbonate (CALCIUM 600 PO) Take 1 Tab by mouth 2 Times a Day.     • Cholecalciferol (VITAMIN D3) 5000 UNITS CAPS Take 1 Cap by mouth every day.     • Ascorbic Acid (VITAMIN C) 1000 MG TABS Take 1,000 mg by mouth every evening. Indications: **OTC**     • Coenzyme Q10 (CO Q 10) 100 MG CAPS Take 100 mg by mouth 2 Times a Day. Indications: **OTC**     • insulin glargine (LANTUS) 100 UNIT/ML SOLN Inject 30 Units as instructed every evening. Sliding scale:  >130=35 units  >135=40 units  >140=45 units  >145=50 units     • leuprolide (LUPRON) 22.5 MG KIT 22.5 mg by Intramuscular route Every 3 Months.     • venlafaxine XR (EFFEXOR XR) 37.5 MG CP24 Take 75 mg by mouth every day.     • metformin (GLUCOPHAGE) 1000 MG tablet Take 1,000 mg by mouth 2 times a day.     • bicalutamide (CASODEX) 50 MG TABS Take 50 mg by mouth every morning.     • gemfibrozil (LOPID) 600 MG TABS Take 300 mg by mouth 2 times a day.     • omeprazole (PRILOSEC) 20 MG CPDR Take 20 mg by mouth 2 Times a Day.     • [DISCONTINUED] carvedilol (COREG) 25 MG Tab Take 1 Tab by mouth 2 times a day, with meals. 180 Tab 3     No facility-administered encounter medications on file as of 6/19/2017.     ROS     Objective:   /84 mmHg  Pulse 108  Ht 1.829 m (6' 0.01\")  Wt 90.719 kg (200 lb)  BMI 27.12 " kg/m2  SpO2 95%    Physical Exam   Constitutional: He is oriented to person, place, and time. He appears well-developed and well-nourished.   HENT:   Mouth/Throat: Oropharynx is clear and moist.   Eyes: Conjunctivae and EOM are normal.   Neck: No JVD present. No thyroid mass present.   Cardiovascular: Normal rate, S1 normal, S2 normal and normal pulses.  An irregularly irregular rhythm present. PMI is not displaced.  Exam reveals no gallop.    No murmur heard.  Pulses:       Carotid pulses are 2+ on the right side, and 2+ on the left side.       Radial pulses are 2+ on the right side, and 2+ on the left side.        Femoral pulses are 2+ on the right side, and 2+ on the left side.       Dorsalis pedis pulses are 2+ on the right side, and 2+ on the left side.   No peripheral edema.   Pulmonary/Chest: Effort normal and breath sounds normal.   Abdominal: Soft. Normal appearance. He exhibits no abdominal bruit. There is no hepatosplenomegaly. There is no tenderness.   Musculoskeletal: Normal range of motion. He exhibits no edema.        Thoracic back: He exhibits no tenderness and no spasm.   Neurological: He is alert and oriented to person, place, and time.   Skin: No rash noted. No cyanosis. Nails show no clubbing.   Psychiatric: He has a normal mood and affect.       Assessment:     1. Persistent atrial fibrillation (CMS-HCC)  digoxin (LANOXIN) 250 MCG Tab   2. Dilated cardiomyopathy (CMS-HCC)  carvedilol (COREG) 25 MG Tab   3. NSVT (nonsustained ventricular tachycardia) (CMS-[facility])     4. Biventricular implantable cardioverter-defibrillator in situ     5. Anticoagulated on warfarin     6. Systolic congestive heart failure, unspecified congestive heart failure chronicity (CMS-[facility])         Medical Decision Making:  Today's Assessment / Status / Plan:     1. A fib with poor rate control. Add dig. Put VT zone up to 205.  2. Anticoagulation continue.  3. NICM.  4. F/U with his cardiologist in [place].      Rajni FERREIRA  [place]  [address]  [address]  [place] [address]  VIA Facsimile: [phone]

## 2017-06-19 NOTE — PROGRESS NOTES
"Subjective:   Jeffry Naik is a 67 y.o. male who presents today who has CAF and BIV AICD. Has been somewhat non compliant with meds. Just lost his wife and is moving to [place]. Has received a couple shocks for a fib with RVR.    Past Medical History   Diagnosis Date   • Atrial fibrillation (CMS-HCC)    • Congestive heart failure (CMS-HCC)    • Dilated cardiomyopathy (CMS-HCC)    • Diabetes      oral meds and insulin   • Prostate cancer (CMS-HCC) 2009   • Biventricular implantable cardiac defibrillator in situ     • Chronic anticoagulation    • Hyperlipidemia    • Anesthesia      \"when I had my pacemaker placed,  my eyes where closed but I could feel everything\"   • Unspecified urinary incontinence    • Unspecified hemorrhagic conditions      on coumadin   • Hypertension    • Bronchitis 2012   • Pain 02-18-15     neck and back, 3/10   • Peripheral neuropathy      bilat arms and hand     Past Surgical History   Procedure Laterality Date   • Aicd implant  December 2011     [employer] Cognis 100-D N118 implanted by Dr. Lake.  Original device implanted in 2006.   • Cholecystectomy  2005   • Prostatectomy, radical retro  2009   • Recovery  2/19/2015     Performed by -Recovery Surgery at SURGERY SAME DAY [facility] ORS     Family History   Problem Relation Age of Onset   • Heart Attack Mother      History   Smoking status   • Light Tobacco Smoker -- 0.25 packs/day   • Types: Cigarettes   Smokeless tobacco   • Never Used     Comment: \"1-3 cigars per week\"     Allergies   Allergen Reactions   • Oxycodone-Acetaminophen Vomiting and Nausea   • Iodine Hives and Diarrhea         • Plasma Hives     Okay if given Benadryl.   • Levofloxacin [Levaquin]      Unknown rxn; \"Possibly sick to my stomach.\"   • Statins [Hmg-Coa-R Inhibitors]      Weak muscles.     Outpatient Encounter Prescriptions as of 6/19/2017   Medication Sig Dispense Refill   • digoxin (LANOXIN) 250 MCG Tab Take 1 Tab by mouth every day. 90 Tab 3 " "  • carvedilol (COREG) 25 MG Tab Take 1 Tab by mouth 2 times a day, with meals. 180 Tab 3   • losartan (COZAAR) 50 MG Tab Take 1 Tab by mouth every day. 90 Tab 3   • B Complex Vitamins (VITAMIN B COMPLEX PO) Take  by mouth 2 Times a Day.     • warfarin (COUMADIN) 2 MG Tab Take 1 Tab by mouth every day. 30 Tab 0   • pramoxine (PROCTOFOAM) 1 % foam Insert  in rectum 4 times a day as needed.     • Calcium Carbonate (CALCIUM 600 PO) Take 1 Tab by mouth 2 Times a Day.     • Cholecalciferol (VITAMIN D3) 5000 UNITS CAPS Take 1 Cap by mouth every day.     • Ascorbic Acid (VITAMIN C) 1000 MG TABS Take 1,000 mg by mouth every evening. Indications: **OTC**     • Coenzyme Q10 (CO Q 10) 100 MG CAPS Take 100 mg by mouth 2 Times a Day. Indications: **OTC**     • insulin glargine (LANTUS) 100 UNIT/ML SOLN Inject 30 Units as instructed every evening. Sliding scale:  >130=35 units  >135=40 units  >140=45 units  >145=50 units     • leuprolide (LUPRON) 22.5 MG KIT 22.5 mg by Intramuscular route Every 3 Months.     • venlafaxine XR (EFFEXOR XR) 37.5 MG CP24 Take 75 mg by mouth every day.     • metformin (GLUCOPHAGE) 1000 MG tablet Take 1,000 mg by mouth 2 times a day.     • bicalutamide (CASODEX) 50 MG TABS Take 50 mg by mouth every morning.     • gemfibrozil (LOPID) 600 MG TABS Take 300 mg by mouth 2 times a day.     • omeprazole (PRILOSEC) 20 MG CPDR Take 20 mg by mouth 2 Times a Day.     • [DISCONTINUED] carvedilol (COREG) 25 MG Tab Take 1 Tab by mouth 2 times a day, with meals. 180 Tab 3     No facility-administered encounter medications on file as of 6/19/2017.     ROS     Objective:   /84 mmHg  Pulse 108  Ht 1.829 m (6' 0.01\")  Wt 90.719 kg (200 lb)  BMI 27.12 kg/m2  SpO2 95%    Physical Exam   Constitutional: He is oriented to person, place, and time. He appears well-developed and well-nourished.   HENT:   Mouth/Throat: Oropharynx is clear and moist.   Eyes: Conjunctivae and EOM are normal.   Neck: No JVD present. No " thyroid mass present.   Cardiovascular: Normal rate, S1 normal, S2 normal and normal pulses.  An irregularly irregular rhythm present. PMI is not displaced.  Exam reveals no gallop.    No murmur heard.  Pulses:       Carotid pulses are 2+ on the right side, and 2+ on the left side.       Radial pulses are 2+ on the right side, and 2+ on the left side.        Femoral pulses are 2+ on the right side, and 2+ on the left side.       Dorsalis pedis pulses are 2+ on the right side, and 2+ on the left side.   No peripheral edema.   Pulmonary/Chest: Effort normal and breath sounds normal.   Abdominal: Soft. Normal appearance. He exhibits no abdominal bruit. There is no hepatosplenomegaly. There is no tenderness.   Musculoskeletal: Normal range of motion. He exhibits no edema.        Thoracic back: He exhibits no tenderness and no spasm.   Neurological: He is alert and oriented to person, place, and time.   Skin: No rash noted. No cyanosis. Nails show no clubbing.   Psychiatric: He has a normal mood and affect.       Assessment:     1. Persistent atrial fibrillation (CMS-HCC)  digoxin (LANOXIN) 250 MCG Tab   2. Dilated cardiomyopathy (CMS-HCC)  carvedilol (COREG) 25 MG Tab   3. NSVT (nonsustained ventricular tachycardia) (CMS-HCC)     4. Biventricular implantable cardioverter-defibrillator in situ     5. Anticoagulated on warfarin     6. Systolic congestive heart failure, unspecified congestive heart failure chronicity (CMS-HCC)         Medical Decision Making:  Today's Assessment / Status / Plan:     1. A fib with poor rate control. Add dig. Put VT zone up to 205.  2. Anticoagulation continue.  3. NICM.  4. F/U with his cardiologist in [place].

## 2017-12-01 ENCOUNTER — TELEPHONE (OUTPATIENT)
Dept: CARDIOLOGY | Facility: MEDICAL CENTER | Age: 67
End: 2017-12-01

## 2018-01-15 DIAGNOSIS — I42.0 DILATED CARDIOMYOPATHY (HCC): ICD-10-CM

## 2018-01-17 RX ORDER — CARVEDILOL 25 MG/1
TABLET ORAL
Qty: 60 TAB | Refills: 3 | Status: SHIPPED | OUTPATIENT
Start: 2018-01-17

## 2018-06-15 DIAGNOSIS — I48.19 PERSISTENT ATRIAL FIBRILLATION (HCC): ICD-10-CM

## 2018-06-15 RX ORDER — DIGOXIN 250 MCG
250 TABLET ORAL DAILY
Qty: 90 TAB | Refills: 0 | Status: SHIPPED | OUTPATIENT
Start: 2018-06-15

## 2021-01-15 DIAGNOSIS — Z23 NEED FOR VACCINATION: ICD-10-CM
